# Patient Record
Sex: FEMALE | Race: WHITE
[De-identification: names, ages, dates, MRNs, and addresses within clinical notes are randomized per-mention and may not be internally consistent; named-entity substitution may affect disease eponyms.]

---

## 2017-05-05 PROBLEM — Z00.00 ENCOUNTER FOR PREVENTIVE HEALTH EXAMINATION: Status: ACTIVE | Noted: 2017-05-05

## 2017-05-11 ENCOUNTER — APPOINTMENT (OUTPATIENT)
Dept: SURGERY | Facility: CLINIC | Age: 47
End: 2017-05-11

## 2017-09-21 ENCOUNTER — APPOINTMENT (OUTPATIENT)
Dept: SURGERY | Facility: CLINIC | Age: 47
End: 2017-09-21
Payer: MEDICARE

## 2017-09-21 PROCEDURE — 99212 OFFICE O/P EST SF 10 MIN: CPT

## 2018-05-24 ENCOUNTER — APPOINTMENT (OUTPATIENT)
Dept: SURGERY | Facility: CLINIC | Age: 48
End: 2018-05-24
Payer: MEDICARE

## 2018-05-24 PROCEDURE — 99212 OFFICE O/P EST SF 10 MIN: CPT

## 2018-06-30 ENCOUNTER — TRANSCRIPTION ENCOUNTER (OUTPATIENT)
Age: 48
End: 2018-06-30

## 2018-06-30 ENCOUNTER — OUTPATIENT (OUTPATIENT)
Dept: OUTPATIENT SERVICES | Facility: HOSPITAL | Age: 48
LOS: 1 days | Discharge: HOME | End: 2018-06-30

## 2018-06-30 DIAGNOSIS — Z00.00 ENCOUNTER FOR GENERAL ADULT MEDICAL EXAMINATION WITHOUT ABNORMAL FINDINGS: ICD-10-CM

## 2018-06-30 DIAGNOSIS — Z79.899 OTHER LONG TERM (CURRENT) DRUG THERAPY: ICD-10-CM

## 2018-06-30 DIAGNOSIS — E55.9 VITAMIN D DEFICIENCY, UNSPECIFIED: ICD-10-CM

## 2018-06-30 DIAGNOSIS — E61.1 IRON DEFICIENCY: ICD-10-CM

## 2018-06-30 DIAGNOSIS — Z13.29 ENCOUNTER FOR SCREENING FOR OTHER SUSPECTED ENDOCRINE DISORDER: ICD-10-CM

## 2018-06-30 DIAGNOSIS — E07.9 DISORDER OF THYROID, UNSPECIFIED: ICD-10-CM

## 2018-09-05 ENCOUNTER — APPOINTMENT (OUTPATIENT)
Dept: NEUROLOGY | Facility: CLINIC | Age: 48
End: 2018-09-05

## 2018-10-08 ENCOUNTER — OUTPATIENT (OUTPATIENT)
Dept: OUTPATIENT SERVICES | Facility: HOSPITAL | Age: 48
LOS: 1 days | Discharge: HOME | End: 2018-10-08

## 2018-10-08 DIAGNOSIS — E61.1 IRON DEFICIENCY: ICD-10-CM

## 2018-10-08 DIAGNOSIS — E55.9 VITAMIN D DEFICIENCY, UNSPECIFIED: ICD-10-CM

## 2018-10-09 LAB
24R-OH-CALCIDIOL SERPL-MCNC: 42 NG/ML — SIGNIFICANT CHANGE UP (ref 30–80)
BASOPHILS # BLD AUTO: 0.05 K/UL — SIGNIFICANT CHANGE UP (ref 0–0.2)
BASOPHILS NFR BLD AUTO: 0.8 % — SIGNIFICANT CHANGE UP (ref 0–1)
EOSINOPHIL # BLD AUTO: 0.08 K/UL — SIGNIFICANT CHANGE UP (ref 0–0.7)
EOSINOPHIL NFR BLD AUTO: 1.2 % — SIGNIFICANT CHANGE UP (ref 0–8)
HCT VFR BLD CALC: 45.4 % — SIGNIFICANT CHANGE UP (ref 37–47)
HGB BLD-MCNC: 14.6 G/DL — SIGNIFICANT CHANGE UP (ref 12–16)
IMM GRANULOCYTES NFR BLD AUTO: 0.2 % — SIGNIFICANT CHANGE UP (ref 0.1–0.3)
LYMPHOCYTES # BLD AUTO: 2.06 K/UL — SIGNIFICANT CHANGE UP (ref 1.2–3.4)
LYMPHOCYTES # BLD AUTO: 31.7 % — SIGNIFICANT CHANGE UP (ref 20.5–51.1)
MCHC RBC-ENTMCNC: 30.2 PG — SIGNIFICANT CHANGE UP (ref 27–31)
MCHC RBC-ENTMCNC: 32.2 G/DL — SIGNIFICANT CHANGE UP (ref 32–37)
MCV RBC AUTO: 94 FL — SIGNIFICANT CHANGE UP (ref 81–99)
MONOCYTES # BLD AUTO: 0.29 K/UL — SIGNIFICANT CHANGE UP (ref 0.1–0.6)
MONOCYTES NFR BLD AUTO: 4.5 % — SIGNIFICANT CHANGE UP (ref 1.7–9.3)
NEUTROPHILS # BLD AUTO: 4.01 K/UL — SIGNIFICANT CHANGE UP (ref 1.4–6.5)
NEUTROPHILS NFR BLD AUTO: 61.6 % — SIGNIFICANT CHANGE UP (ref 42.2–75.2)
NRBC # BLD: 0 /100 WBCS — SIGNIFICANT CHANGE UP (ref 0–0)
PLATELET # BLD AUTO: 264 K/UL — SIGNIFICANT CHANGE UP (ref 130–400)
RBC # BLD: 4.83 M/UL — SIGNIFICANT CHANGE UP (ref 4.2–5.4)
RBC # FLD: 14.6 % — HIGH (ref 11.5–14.5)
WBC # BLD: 6.5 K/UL — SIGNIFICANT CHANGE UP (ref 4.8–10.8)
WBC # FLD AUTO: 6.5 K/UL — SIGNIFICANT CHANGE UP (ref 4.8–10.8)

## 2018-10-10 LAB — FERRITIN SERPL-MCNC: 18 NG/ML — SIGNIFICANT CHANGE UP (ref 15–150)

## 2019-03-28 ENCOUNTER — OUTPATIENT (OUTPATIENT)
Dept: OUTPATIENT SERVICES | Facility: HOSPITAL | Age: 49
LOS: 1 days | Discharge: HOME | End: 2019-03-28

## 2019-03-28 DIAGNOSIS — E55.9 VITAMIN D DEFICIENCY, UNSPECIFIED: ICD-10-CM

## 2019-03-28 DIAGNOSIS — Z13.29 ENCOUNTER FOR SCREENING FOR OTHER SUSPECTED ENDOCRINE DISORDER: ICD-10-CM

## 2019-03-28 DIAGNOSIS — E61.1 IRON DEFICIENCY: ICD-10-CM

## 2019-03-29 ENCOUNTER — TRANSCRIPTION ENCOUNTER (OUTPATIENT)
Age: 49
End: 2019-03-29

## 2019-04-04 ENCOUNTER — APPOINTMENT (OUTPATIENT)
Dept: SURGERY | Facility: CLINIC | Age: 49
End: 2019-04-04
Payer: MEDICARE

## 2019-04-04 PROCEDURE — 99212 OFFICE O/P EST SF 10 MIN: CPT

## 2019-04-04 NOTE — PHYSICAL EXAM
[de-identified] : well [de-identified] : no adenopathy [de-identified] : Symmetric x2. No palpable masses. Axilla without adenopathy. Nipples wnl.

## 2019-04-04 NOTE — ASSESSMENT
[FreeTextEntry1] : No findings on images or exam. Due for Mammo/sono in May but push off till 6 months from MRI - so September.

## 2019-04-04 NOTE — HISTORY OF PRESENT ILLNESS
[de-identified] : Presents almost 1 yr from previous for high risk patient. Had MRI 3/19 with good results. Due for mammo/sono in May. No breast complaints.Health is good lost some weight. Recent loss of mother and uncle.

## 2019-05-08 ENCOUNTER — APPOINTMENT (OUTPATIENT)
Dept: OBGYN | Facility: CLINIC | Age: 49
End: 2019-05-08
Payer: MEDICARE

## 2019-05-08 PROCEDURE — 99214 OFFICE O/P EST MOD 30 MIN: CPT | Mod: 25

## 2019-05-08 PROCEDURE — 99000 SPECIMEN HANDLING OFFICE-LAB: CPT

## 2020-04-16 ENCOUNTER — APPOINTMENT (OUTPATIENT)
Dept: SURGERY | Facility: CLINIC | Age: 50
End: 2020-04-16

## 2020-07-02 ENCOUNTER — APPOINTMENT (OUTPATIENT)
Dept: SURGERY | Facility: CLINIC | Age: 50
End: 2020-07-02

## 2020-07-30 ENCOUNTER — APPOINTMENT (OUTPATIENT)
Dept: SURGERY | Facility: CLINIC | Age: 50
End: 2020-07-30
Payer: MEDICARE

## 2020-07-30 PROCEDURE — 99212 OFFICE O/P EST SF 10 MIN: CPT

## 2020-07-30 NOTE — PHYSICAL EXAM
[de-identified] : well [de-identified] : no adenopathy [de-identified] : Symmetric x2. No palp masses. Some thickening on right 6:00, recent bx site. Axilla w/o adenopathy. Nipples wnl.

## 2020-07-30 NOTE — HISTORY OF PRESENT ILLNESS
[de-identified] : Presents for routine f/u. Had MRI and bx and was benign. Still due for mammo/sono for several years. No breast complaints.

## 2021-02-04 ENCOUNTER — APPOINTMENT (OUTPATIENT)
Dept: SURGERY | Facility: CLINIC | Age: 51
End: 2021-02-04
Payer: MEDICARE

## 2021-02-04 PROCEDURE — 99212 OFFICE O/P EST SF 10 MIN: CPT

## 2021-02-04 NOTE — HISTORY OF PRESENT ILLNESS
[de-identified] : High risk f'/u 6 month surveillance.Had mamm/sono without finding. Denies any breast complaints.No new medical issues. Reports family issues are fine.

## 2021-02-04 NOTE — PHYSICAL EXAM
[de-identified] : well [de-identified] : no adenopathy [de-identified] : Symmetric X2, no palpable masses, no adenopathy, nipples wnl.

## 2021-02-04 NOTE — ASSESSMENT
[FreeTextEntry1] : No significant findings on exam or imaging. Confused by mammo recommendation for MRI which is due in June. No need for sono f/u in 6 months due to MRI. Discussed this with patient and agrees.

## 2021-06-16 ENCOUNTER — APPOINTMENT (OUTPATIENT)
Dept: OBGYN | Facility: CLINIC | Age: 51
End: 2021-06-16
Payer: MEDICARE

## 2021-06-16 VITALS
SYSTOLIC BLOOD PRESSURE: 122 MMHG | HEIGHT: 61 IN | DIASTOLIC BLOOD PRESSURE: 78 MMHG | BODY MASS INDEX: 25.49 KG/M2 | WEIGHT: 135 LBS | TEMPERATURE: 98 F

## 2021-06-16 PROCEDURE — G0101: CPT

## 2021-06-16 NOTE — DISCUSSION/SUMMARY
[FreeTextEntry1] : Pap done\par Self breast exam stressed\par Follow-up with breast specialist\par Keep menstrual calendar\par Issues regarding irregular menses/perimenopause discussed with patient\par Prescribed hormone profile and pelvic ultrasound\par Follow-up yearly or as needed

## 2021-06-17 ENCOUNTER — APPOINTMENT (OUTPATIENT)
Dept: SURGERY | Facility: CLINIC | Age: 51
End: 2021-06-17
Payer: MEDICARE

## 2021-06-17 PROCEDURE — 99212 OFFICE O/P EST SF 10 MIN: CPT

## 2021-06-17 NOTE — ASSESSMENT
[FreeTextEntry1] : No findings of concern on MRI or exam. Explained request for left sono, script given. Call for results. Discussed future f/u with Dr Burgos since I am retiring.

## 2021-06-17 NOTE — PHYSICAL EXAM
[de-identified] : well [de-identified] : no adenopathy [de-identified] : Symmetric X2. No palpable masses. Axilla w/o adenopathy. Nipples wnl.

## 2021-06-17 NOTE — HISTORY OF PRESENT ILLNESS
[de-identified] : Presents for interval f/u, high risk patient due to family history. Had recent MRI. No breast complaints. No new health issues.

## 2021-06-17 NOTE — PLAN
[FreeTextEntry1] : Check results of left breast sono. F/u in 6 months. Will be due for mammo/sono at that time.

## 2021-07-28 ENCOUNTER — APPOINTMENT (OUTPATIENT)
Dept: OBGYN | Facility: CLINIC | Age: 51
End: 2021-07-28
Payer: MEDICARE

## 2021-07-28 VITALS
BODY MASS INDEX: 25.49 KG/M2 | HEIGHT: 61 IN | DIASTOLIC BLOOD PRESSURE: 72 MMHG | SYSTOLIC BLOOD PRESSURE: 118 MMHG | WEIGHT: 135 LBS

## 2021-07-28 DIAGNOSIS — N84.1 POLYP OF CERVIX UTERI: ICD-10-CM

## 2021-07-28 DIAGNOSIS — N84.0 POLYP OF CORPUS UTERI: ICD-10-CM

## 2021-07-28 PROCEDURE — 99213 OFFICE O/P EST LOW 20 MIN: CPT

## 2021-09-17 ENCOUNTER — OUTPATIENT (OUTPATIENT)
Dept: OUTPATIENT SERVICES | Facility: HOSPITAL | Age: 51
LOS: 1 days | Discharge: HOME | End: 2021-09-17
Payer: MEDICARE

## 2021-09-17 VITALS
SYSTOLIC BLOOD PRESSURE: 114 MMHG | DIASTOLIC BLOOD PRESSURE: 65 MMHG | WEIGHT: 150.8 LBS | RESPIRATION RATE: 18 BRPM | OXYGEN SATURATION: 99 % | HEIGHT: 61 IN | HEART RATE: 63 BPM | TEMPERATURE: 96 F

## 2021-09-17 DIAGNOSIS — Z90.89 ACQUIRED ABSENCE OF OTHER ORGANS: Chronic | ICD-10-CM

## 2021-09-17 DIAGNOSIS — Z01.818 ENCOUNTER FOR OTHER PREPROCEDURAL EXAMINATION: ICD-10-CM

## 2021-09-17 DIAGNOSIS — N93.8 OTHER SPECIFIED ABNORMAL UTERINE AND VAGINAL BLEEDING: ICD-10-CM

## 2021-09-17 DIAGNOSIS — Z98.890 OTHER SPECIFIED POSTPROCEDURAL STATES: Chronic | ICD-10-CM

## 2021-09-17 LAB
ALBUMIN SERPL ELPH-MCNC: 4.4 G/DL — SIGNIFICANT CHANGE UP (ref 3.5–5.2)
ALP SERPL-CCNC: 56 U/L — SIGNIFICANT CHANGE UP (ref 30–115)
ALT FLD-CCNC: 6 U/L — SIGNIFICANT CHANGE UP (ref 0–41)
ANION GAP SERPL CALC-SCNC: 12 MMOL/L — SIGNIFICANT CHANGE UP (ref 7–14)
AST SERPL-CCNC: 19 U/L — SIGNIFICANT CHANGE UP (ref 0–41)
BASOPHILS # BLD AUTO: 0.08 K/UL — SIGNIFICANT CHANGE UP (ref 0–0.2)
BASOPHILS NFR BLD AUTO: 1.2 % — HIGH (ref 0–1)
BILIRUB SERPL-MCNC: 0.3 MG/DL — SIGNIFICANT CHANGE UP (ref 0.2–1.2)
BUN SERPL-MCNC: 18 MG/DL — SIGNIFICANT CHANGE UP (ref 10–20)
CALCIUM SERPL-MCNC: 9.2 MG/DL — SIGNIFICANT CHANGE UP (ref 8.5–10.1)
CHLORIDE SERPL-SCNC: 102 MMOL/L — SIGNIFICANT CHANGE UP (ref 98–110)
CO2 SERPL-SCNC: 23 MMOL/L — SIGNIFICANT CHANGE UP (ref 17–32)
CREAT SERPL-MCNC: 1.1 MG/DL — SIGNIFICANT CHANGE UP (ref 0.7–1.5)
EOSINOPHIL # BLD AUTO: 0.15 K/UL — SIGNIFICANT CHANGE UP (ref 0–0.7)
EOSINOPHIL NFR BLD AUTO: 2.2 % — SIGNIFICANT CHANGE UP (ref 0–8)
GLUCOSE SERPL-MCNC: 70 MG/DL — SIGNIFICANT CHANGE UP (ref 70–99)
HCT VFR BLD CALC: 43.6 % — SIGNIFICANT CHANGE UP (ref 37–47)
HGB BLD-MCNC: 14.7 G/DL — SIGNIFICANT CHANGE UP (ref 12–16)
IMM GRANULOCYTES NFR BLD AUTO: 0.1 % — SIGNIFICANT CHANGE UP (ref 0.1–0.3)
LYMPHOCYTES # BLD AUTO: 2.44 K/UL — SIGNIFICANT CHANGE UP (ref 1.2–3.4)
LYMPHOCYTES # BLD AUTO: 35.7 % — SIGNIFICANT CHANGE UP (ref 20.5–51.1)
MCHC RBC-ENTMCNC: 30.2 PG — SIGNIFICANT CHANGE UP (ref 27–31)
MCHC RBC-ENTMCNC: 33.7 G/DL — SIGNIFICANT CHANGE UP (ref 32–37)
MCV RBC AUTO: 89.5 FL — SIGNIFICANT CHANGE UP (ref 81–99)
MONOCYTES # BLD AUTO: 0.36 K/UL — SIGNIFICANT CHANGE UP (ref 0.1–0.6)
MONOCYTES NFR BLD AUTO: 5.3 % — SIGNIFICANT CHANGE UP (ref 1.7–9.3)
NEUTROPHILS # BLD AUTO: 3.8 K/UL — SIGNIFICANT CHANGE UP (ref 1.4–6.5)
NEUTROPHILS NFR BLD AUTO: 55.5 % — SIGNIFICANT CHANGE UP (ref 42.2–75.2)
NRBC # BLD: 0 /100 WBCS — SIGNIFICANT CHANGE UP (ref 0–0)
PLATELET # BLD AUTO: 234 K/UL — SIGNIFICANT CHANGE UP (ref 130–400)
POTASSIUM SERPL-MCNC: 4.4 MMOL/L — SIGNIFICANT CHANGE UP (ref 3.5–5)
POTASSIUM SERPL-SCNC: 4.4 MMOL/L — SIGNIFICANT CHANGE UP (ref 3.5–5)
PROT SERPL-MCNC: 7 G/DL — SIGNIFICANT CHANGE UP (ref 6–8)
RBC # BLD: 4.87 M/UL — SIGNIFICANT CHANGE UP (ref 4.2–5.4)
RBC # FLD: 12.5 % — SIGNIFICANT CHANGE UP (ref 11.5–14.5)
SODIUM SERPL-SCNC: 137 MMOL/L — SIGNIFICANT CHANGE UP (ref 135–146)
WBC # BLD: 6.84 K/UL — SIGNIFICANT CHANGE UP (ref 4.8–10.8)
WBC # FLD AUTO: 6.84 K/UL — SIGNIFICANT CHANGE UP (ref 4.8–10.8)

## 2021-09-17 PROCEDURE — 93010 ELECTROCARDIOGRAM REPORT: CPT

## 2021-09-17 NOTE — H&P PST ADULT - NSICDXPASTSURGICALHX_GEN_ALL_CORE_FT
PAST SURGICAL HISTORY:  H/O knee surgery acl repair and meniscus repair    History of tonsillectomy

## 2021-09-17 NOTE — H&P PST ADULT - NSANTHOSAYNRD_GEN_A_CORE
No. NEEL screening performed.  STOP BANG Legend: 0-2 = LOW Risk; 3-4 = INTERMEDIATE Risk; 5-8 = HIGH Risk

## 2021-09-17 NOTE — H&P PST ADULT - NSICDXPASTMEDICALHX_GEN_ALL_CORE_FT
PAST MEDICAL HISTORY:  Asthma over 10yrs ago    H/O carpal tunnel syndrome     OA (osteoarthritis)     Seasonal allergies

## 2021-09-17 NOTE — H&P PST ADULT - REASON FOR ADMISSION
49 Y/O F SCHEDULED FOR PAST FOR Hysteroscopy Dilation & Curettage ON 10/8/21 with Dr Marti on 10/8/21 under [place]. Pt reports during routine checkup/transvaginal ultrasound she had a polyp on cervix. Now for scheduled procedure. Denies pain/vaginal bleeding.

## 2021-09-17 NOTE — H&P PST ADULT - NSICDXFAMILYHX_GEN_ALL_CORE_FT
FAMILY HISTORY:  Mother  Still living? Unknown  FHx: breast cancer, Age at diagnosis: Age Unknown    Grandparent  Still living? Unknown  FHx: breast cancer, Age at diagnosis: Age Unknown    Aunt  Still living? Unknown  FHx: breast cancer, Age at diagnosis: Age Unknown

## 2021-09-17 NOTE — H&P PST ADULT - EYES
Faxed pre op h&p, cxr, ekg, and labs to attn: Joe Bynum at [facility] fax # [phone]
EOMI; PERRL; no drainage or redness

## 2021-09-17 NOTE — H&P PST ADULT - HISTORY OF PRESENT ILLNESS
CURRENTLY  DENIES ANY CP, SOB,PALPITATIONS,COUGH OR DYSURIA  EXERCISE TOLERANCE 2 FOS WITHOUT SOB    AS PER PATIENT  this is his/her complete medical history including medications - PRESCRIPTIONS  OVER THE COUNTER MEDS    pt denies any covid s/s, Has tested positive in the past 3/2020.  Has not Received covid vaccine  pt advised self quarantine till day of procedure    Anesthesia Alert  NO--Difficult Airway  NO--History of neck surgery or radiation  NO--Limited ROM of neck  NO--History of Malignant hyperthermia  NO--No personal or family history of Pseudocholinesterase deficiency.  NO--Prior Anesthesia Complication  NO--Latex Allergy  NO--Loose teeth  NO--History of Rheumatoid Arthritis  NO--NEEL  NO--Bleeding risk  NO--Other_____    Patient verbalized understanding of instructions and was given the opportunity to ask questions and have them answered.

## 2021-09-29 PROBLEM — J45.909 UNSPECIFIED ASTHMA, UNCOMPLICATED: Chronic | Status: ACTIVE | Noted: 2021-09-17

## 2021-09-29 PROBLEM — J30.2 OTHER SEASONAL ALLERGIC RHINITIS: Chronic | Status: ACTIVE | Noted: 2021-09-17

## 2021-09-29 PROBLEM — Z86.69 PERSONAL HISTORY OF OTHER DISEASES OF THE NERVOUS SYSTEM AND SENSE ORGANS: Chronic | Status: ACTIVE | Noted: 2021-09-17

## 2021-09-29 PROBLEM — M19.90 UNSPECIFIED OSTEOARTHRITIS, UNSPECIFIED SITE: Chronic | Status: ACTIVE | Noted: 2021-09-17

## 2021-10-05 ENCOUNTER — LABORATORY RESULT (OUTPATIENT)
Age: 51
End: 2021-10-05

## 2021-10-05 ENCOUNTER — OUTPATIENT (OUTPATIENT)
Dept: OUTPATIENT SERVICES | Facility: HOSPITAL | Age: 51
LOS: 1 days | Discharge: HOME | End: 2021-10-05

## 2021-10-05 DIAGNOSIS — Z90.89 ACQUIRED ABSENCE OF OTHER ORGANS: Chronic | ICD-10-CM

## 2021-10-05 DIAGNOSIS — Z98.890 OTHER SPECIFIED POSTPROCEDURAL STATES: Chronic | ICD-10-CM

## 2021-10-05 DIAGNOSIS — Z11.59 ENCOUNTER FOR SCREENING FOR OTHER VIRAL DISEASES: ICD-10-CM

## 2021-10-05 DIAGNOSIS — N88.2 STRICTURE AND STENOSIS OF CERVIX UTERI: ICD-10-CM

## 2021-10-05 RX ORDER — MISOPROSTOL 200 UG/1
200 TABLET ORAL
Qty: 2 | Refills: 0 | Status: ACTIVE | COMMUNITY
Start: 2021-10-05 | End: 1900-01-01

## 2021-10-08 ENCOUNTER — APPOINTMENT (OUTPATIENT)
Dept: OBGYN | Facility: CLINIC | Age: 51
End: 2021-10-08

## 2021-10-08 ENCOUNTER — RESULT REVIEW (OUTPATIENT)
Age: 51
End: 2021-10-08

## 2021-10-08 ENCOUNTER — OUTPATIENT (OUTPATIENT)
Dept: OUTPATIENT SERVICES | Facility: HOSPITAL | Age: 51
LOS: 1 days | Discharge: HOME | End: 2021-10-08
Payer: MEDICARE

## 2021-10-08 VITALS
SYSTOLIC BLOOD PRESSURE: 100 MMHG | OXYGEN SATURATION: 99 % | RESPIRATION RATE: 20 BRPM | HEART RATE: 56 BPM | DIASTOLIC BLOOD PRESSURE: 56 MMHG

## 2021-10-08 VITALS
SYSTOLIC BLOOD PRESSURE: 113 MMHG | HEART RATE: 70 BPM | TEMPERATURE: 98 F | HEIGHT: 61 IN | RESPIRATION RATE: 18 BRPM | OXYGEN SATURATION: 99 % | WEIGHT: 150.8 LBS | DIASTOLIC BLOOD PRESSURE: 53 MMHG

## 2021-10-08 DIAGNOSIS — Z90.89 ACQUIRED ABSENCE OF OTHER ORGANS: Chronic | ICD-10-CM

## 2021-10-08 DIAGNOSIS — Z98.890 OTHER SPECIFIED POSTPROCEDURAL STATES: Chronic | ICD-10-CM

## 2021-10-08 PROCEDURE — 58558 HYSTEROSCOPY BIOPSY: CPT

## 2021-10-08 PROCEDURE — 88305 TISSUE EXAM BY PATHOLOGIST: CPT | Mod: 26

## 2021-10-08 RX ORDER — CHOLECALCIFEROL (VITAMIN D3) 125 MCG
1 CAPSULE ORAL
Qty: 0 | Refills: 0 | DISCHARGE

## 2021-10-08 RX ORDER — OXYCODONE AND ACETAMINOPHEN 5; 325 MG/1; MG/1
1 TABLET ORAL EVERY 4 HOURS
Refills: 0 | Status: DISCONTINUED | OUTPATIENT
Start: 2021-10-08 | End: 2021-10-08

## 2021-10-08 RX ORDER — FERROUS SULFATE 325(65) MG
65 TABLET ORAL
Qty: 0 | Refills: 0 | DISCHARGE

## 2021-10-08 RX ORDER — SODIUM CHLORIDE 9 MG/ML
1000 INJECTION, SOLUTION INTRAVENOUS
Refills: 0 | Status: DISCONTINUED | OUTPATIENT
Start: 2021-10-08 | End: 2021-10-22

## 2021-10-08 RX ORDER — HYDROMORPHONE HYDROCHLORIDE 2 MG/ML
0.5 INJECTION INTRAMUSCULAR; INTRAVENOUS; SUBCUTANEOUS
Refills: 0 | Status: DISCONTINUED | OUTPATIENT
Start: 2021-10-08 | End: 2021-10-08

## 2021-10-08 RX ORDER — ONDANSETRON 8 MG/1
4 TABLET, FILM COATED ORAL ONCE
Refills: 0 | Status: DISCONTINUED | OUTPATIENT
Start: 2021-10-08 | End: 2021-10-22

## 2021-10-08 RX ORDER — ALBUTEROL 90 UG/1
0 AEROSOL, METERED ORAL
Qty: 0 | Refills: 0 | DISCHARGE

## 2021-10-08 RX ORDER — CETIRIZINE HYDROCHLORIDE 10 MG/1
1 TABLET ORAL
Qty: 0 | Refills: 0 | DISCHARGE

## 2021-10-08 RX ADMIN — SODIUM CHLORIDE 100 MILLILITER(S): 9 INJECTION, SOLUTION INTRAVENOUS at 10:11

## 2021-10-08 NOTE — BRIEF OPERATIVE NOTE - OPERATION/FINDINGS
Examined under anesthesia which revealed a normal anteverted uterus. Normal vagina and cervix. No adnexal masses palpated. Hysteroscopy revealed normal ostia bilaterally. No endometrial polyps seen. Possible endocervical polyp. Gentle sharp curettage revealed moderate amounts of tissue. Examined under anesthesia which revealed a normal anteverted uterus. Normal vagina and cervix. No adnexal masses palpated. Hysteroscopy revealed normal ostia bilaterally. No endometrial polyps or descrite endocervical polyp noted. Gentle sharp curettage revealed moderate amount of tissue. Examined under anesthesia which revealed a normal anteverted uterus. Normal vagina and cervix. No adnexal masses palpated. Hysteroscopy revealed normal ostia bilaterally. No endometrial polyps or discrete endocervical polyp noted. Gentle sharp curettage revealed moderate amount of tissue.

## 2021-10-08 NOTE — CHART NOTE - NSCHARTNOTEFT_GEN_A_CORE
PACU ANESTHESIA ADMISSION NOTE      Procedure: Hysteroscopy, with dilation and curettage      Post op diagnosis:  DUB (dysfunctional uterine bleeding)        ____  Intubated  TV:______       Rate: ______      FiO2: ______    _x___  Patent Airway    _x___  Full return of protective reflexes    _x___  Full recovery from anesthesia / back to baseline status    Vitals:            T: 98               BP :    103/59            R:  19            Sat:  96             P:71      Mental Status:  _x___ Awake   _____ Alert   _____ Drowsy   _____ Sedated    Nausea/Vomiting:  _x___  NO       ______Yes,   See Post - Op Orders         Pain Scale (0-10):  __0___    Treatment: _x___ None    ____ See Post - Op/PCA Orders    Post - Operative Fluids:   __x__ Oral   ____ See Post - Op Orders    Plan: Discharge:   _x___Home       _____Floor     _____Critical Care    _____  Other:_________________    Comments:  No anesthesia issues or complications noted.  Discharge when criteria met.

## 2021-10-08 NOTE — ASU DISCHARGE PLAN (ADULT/PEDIATRIC) - CARE PROVIDER_API CALL
Bj Marti  Obstetrics and Gynecology  [address] [place] [address]  Phone: [phone]  Fax: [phone]  Follow Up Time: 2 weeks

## 2021-10-08 NOTE — BRIEF OPERATIVE NOTE - NSICDXBRIEFPREOP_GEN_ALL_CORE_FT
PRE-OP DIAGNOSIS:  Endocervical polyp 08-Oct-2021 10:14:17  Charlotte Freeman  DUB (dysfunctional uterine bleeding) 08-Oct-2021 10:14:07  Charlotte Freeman

## 2021-10-11 LAB — SURGICAL PATHOLOGY STUDY: SIGNIFICANT CHANGE UP

## 2021-10-14 ENCOUNTER — APPOINTMENT (OUTPATIENT)
Dept: BREAST CENTER | Facility: CLINIC | Age: 51
End: 2021-10-14
Payer: MEDICARE

## 2021-10-14 VITALS
HEIGHT: 61 IN | DIASTOLIC BLOOD PRESSURE: 80 MMHG | TEMPERATURE: 97.2 F | SYSTOLIC BLOOD PRESSURE: 126 MMHG | BODY MASS INDEX: 28.32 KG/M2 | WEIGHT: 150 LBS

## 2021-10-14 DIAGNOSIS — N60.11 DIFFUSE CYSTIC MASTOPATHY OF RIGHT BREAST: ICD-10-CM

## 2021-10-14 DIAGNOSIS — N93.8 OTHER SPECIFIED ABNORMAL UTERINE AND VAGINAL BLEEDING: ICD-10-CM

## 2021-10-14 DIAGNOSIS — J45.909 UNSPECIFIED ASTHMA, UNCOMPLICATED: ICD-10-CM

## 2021-10-14 DIAGNOSIS — N60.12 DIFFUSE CYSTIC MASTOPATHY OF LEFT BREAST: ICD-10-CM

## 2021-10-14 PROCEDURE — 99212 OFFICE O/P EST SF 10 MIN: CPT

## 2021-10-14 NOTE — ASSESSMENT
[FreeTextEntry1] : Lo is 50 year old female here as a high risk patient due to family history. Now with BIRADS 3 abnormality on MRI and US\par \par On physical exam, there are no discrete masses in either breast or axilla. There is no nipple discharge or inversion bilaterally. There are no skin changes bilaterally.\par \par Her imaging as follows \par 01/11/21 b/l screening mammo and US\par -heterogeneously dense breasts\par -benign bx marker b/l breast \par -b/l asymmetries are not significantly changed compared to previous years. \par -b/l benign appearing calcifications. \par \par LEFT US\par -@1N2- 0.6 cm  foci of fibrocystic change \par -@6n2- 0.5cm foci of fibrocystic change \par BIRADS0\par \par 6/10/21 b/l MRI\par -breasts are moderately glandular\par -benign bx marker at the site of privious linear enhancement in the right lower central breast.\par -no definite MRI evidence of malignancy in either breast \par BIRADS3 \par \par 7/6/21 \par LEFT US:\par -@1N2 0.6 cm  foci of fibrocystic change\par -@6n2- 0.4cm foci of fibrocystic change \par BIRADS3 \par \par \par We discussed dense breasts.  Increasing breast density has been found to increase ones risk of breast cancer, but at this time, there is no clear indication for additional imaging in this setting, as both US and MRI have not been found to improve survival.  One can consider bilateral screening US.  However, out of 1000 women screened, the use of routine US will only identify an additional 3-5 cancers.  The use of US was found to increase the likelihood of undergoing more imaging and more biopsies.  She does have dense breasts.  We have decided to proceed with screening bilateral breast US at this time.  This will be scheduled with her next screening mammogram.\par \par We also discussed BIRADS 3 lesions.  These lesions have a 2% chance of harboring malignancy.  There is always an option to obtain a tissue sample with a biopsy to confirm the diagnosis.   The procedure was described in detail including the placement of a tissue marker clip.  The risks of the procedure, including but not limited to bleeding and infection were also explained.  She is not interested in biopsy at this time and agrees to continue with short-term interval imaging.\par \par HER RISK ASSESSMENT: \par Hiwot\par 5yr -- 3.1%\par lifetime --23.4%\par \par TC v6\par 5yr --3.9 %\par lifetime -- 21.8%\par This puts her in the high risk category for breast cancer because she has a lifetime risk of >20%.  She qualifies for annual screening MRIs which would be done in an alternating fashion with her screening mammograms such that an imaging study and clinical breast exam would be performed every 6 months.  The use of MRIs have not been shown to prolong survival, however out of 1000 women screened, an additional 14-15 cancers will be identified.  The use of MRIs, has, however, been shown to increase the number of procedures and additional imaging because although it is a very sensitive test, it is not as specific.  This was discussed with the patient and she would like to proceed with screening MRIs.  This will be scheduled for 11/2018.\par \par In addition, because her 5yr risk exceeds 1.7%, she also qualifies for chemopreventative medications.  For premenopausal women, we can offer tamoxifen 20 mg daily for 5 years and for postmenopausal women, we can offer either tamoxifen or raloxifene x 5 years.  This medication has been found to reduce the risk of breast cancer by about 50%.  The risks associated with these medications include thromboembolic disease, uterine cancer, and cataracts, as well as some side effects including hot flashes, vasomotor symptoms, weight gain or hair thinning/loss.  This was briefly discussed with the patient, however she would like to think about this and will let me know if she would like more information regarding this medication.\par \par \par PLAN\par - b/l mammo and US  January 2022\par -MRI on 6/10/2022\par -follow up after

## 2021-10-14 NOTE — HISTORY OF PRESENT ILLNESS
[FreeTextEntry1] : Lo is 50 year old female here as a high risk patient due to family history. Now with BIRADS 3 abnormality on MRI and US\par She has no breast related complaints at present time.\par \par Her imaging as follows \par 01/11/21 b/l screening mammo and US\par -heterogeneously dense breasts\par -benign bx marker b/l breast \par -b/l asymmetries are not significantly changed compared to previous years. \par -b/l benign appearing calcifications. \par \par LEFT US\par -@1N2- 0.6 cm  foci of fibrocystic change \par -@6n2- 0.5cm foci of fibrocystic change \par BIRADS0\par \par 6/10/21 b/l MRI\par -breasts are moderately glandular\par -benign bx marker at the site of privious linear enhancement in the right lower central breast.\par -no definite MRI evidence of malignancy in either breast \par BIRADS3 \par \par 7/6/21 \par LEFT US:\par -@1N2 0.6 cm  foci of fibrocystic change\par -@6n2- 0.4cm foci of fibrocystic change \par BIRADS3 \par \par HISTORIC RISK FACTORS\par -hx of b/l breast bx - benign \par -fam hx of breast cancer in mother at 48 yo, maternal grandmother age 53, maternal aunt age 50\par -G0\par -no OCP use\par -reports negative genetic testing in 2014\par -no GYN surgery\par \par \par RISK ASSESSMENT: \par Hiwot\par 5yr -- 3.1%\par lifetime --23.4%\par \par TC v6\par 5yr --3.9 %\par lifetime -- 21.8%\par

## 2021-10-14 NOTE — PHYSICAL EXAM
[Normocephalic] : normocephalic [Atraumatic] : atraumatic [EOMI] : extra ocular movement intact [Examined in the supine and seated position] : examined in the supine and seated position [Symmetrical] : symmetrical [No dominant masses] : no dominant masses in right breast  [No dominant masses] : no dominant masses left breast [No Nipple Retraction] : no left nipple retraction [No Nipple Discharge] : no left nipple discharge [No Axillary Lymphadenopathy] : no left axillary lymphadenopathy [No Edema] : no edema [No Rashes] : no rashes [No Ulceration] : no ulceration [de-identified] : On physical exam, there are no discrete masses in either breast or axilla. There is no nipple discharge or inversion bilaterally. There are no skin changes bilaterally.\par \par

## 2021-10-27 ENCOUNTER — APPOINTMENT (OUTPATIENT)
Dept: OBGYN | Facility: CLINIC | Age: 51
End: 2021-10-27
Payer: MEDICARE

## 2021-10-27 VITALS
WEIGHT: 150 LBS | SYSTOLIC BLOOD PRESSURE: 118 MMHG | DIASTOLIC BLOOD PRESSURE: 74 MMHG | HEIGHT: 61 IN | BODY MASS INDEX: 28.32 KG/M2

## 2021-10-27 DIAGNOSIS — N92.6 IRREGULAR MENSTRUATION, UNSPECIFIED: ICD-10-CM

## 2021-10-27 PROCEDURE — 99213 OFFICE O/P EST LOW 20 MIN: CPT

## 2021-10-27 NOTE — DISCUSSION/SUMMARY
[FreeTextEntry1] : The possibility of endocervical polyp versus endometrial polyp was discussed with the patient.\par Issues regarding endometrial/endocervical polyp discussed with patient including possible etiologies, diagnostic and treatment options\par Patient decided to go forward with examination under anesthesia/hysteroscopy D&C\par Risks benefits and alternatives of procedure discussed with the patient\par We will schedule the procedure.\par

## 2021-10-27 NOTE — REASON FOR VISIT
[Post Op Day: ___] : Post-Op Day:  #[unfilled] [Follow-Up] : a follow-up evaluation of [Abnormal Uterine Bleeding] : abnormal uterine bleeding

## 2021-10-27 NOTE — HISTORY OF PRESENT ILLNESS
[FreeTextEntry1] : Patient is 50 years old para 0-0-0-0 last menstrual period October 19, 2021\par She is status post hysteroscopy D&C on October 8, 2021\par The pathology results were discussed with patient which revealed fragments of disordered proliferative endometrium. Endo and ectocervical tissue with no significant diagnostic abnormality.

## 2021-10-27 NOTE — HISTORY OF PRESENT ILLNESS
[FreeTextEntry1] : Patient is 50 years old para 0-0-0-0 last menstrual period July 12, 2021\par She is here for follow-up of labs and pelvic ultrasound\par Labs were noted to be within normal limits.\par Pelvic ultrasound revealed a fundal fibroid measuring 1.2 cm, endometrium measures up to 0.5 cm, left ovary contains a simple 1.5 cm follicle.\par Within the endocervical canal there is fluid noted as well as a 1.1 cm echogenic avascular soft tissue nodule this could represent a polyp among other possibilities.

## 2021-10-27 NOTE — DISCUSSION/SUMMARY
[FreeTextEntry1] : Issues regarding irregular menses/perimenopause discussed with patient including various etiologies, diagnostic and treatment options.\par Patient advised to keep a menstrual calendar\par Follow-up with breast specialist as scheduled\par Follow-up yearly or as needed

## 2022-01-05 ENCOUNTER — RESULT REVIEW (OUTPATIENT)
Age: 52
End: 2022-01-05

## 2022-01-05 ENCOUNTER — OUTPATIENT (OUTPATIENT)
Dept: OUTPATIENT SERVICES | Facility: HOSPITAL | Age: 52
LOS: 1 days | Discharge: HOME | End: 2022-01-05
Payer: MEDICARE

## 2022-01-05 DIAGNOSIS — R92.2 INCONCLUSIVE MAMMOGRAM: ICD-10-CM

## 2022-01-05 DIAGNOSIS — Z98.890 OTHER SPECIFIED POSTPROCEDURAL STATES: Chronic | ICD-10-CM

## 2022-01-05 DIAGNOSIS — Z12.31 ENCOUNTER FOR SCREENING MAMMOGRAM FOR MALIGNANT NEOPLASM OF BREAST: ICD-10-CM

## 2022-01-05 DIAGNOSIS — Z90.89 ACQUIRED ABSENCE OF OTHER ORGANS: Chronic | ICD-10-CM

## 2022-01-05 PROCEDURE — 77063 BREAST TOMOSYNTHESIS BI: CPT | Mod: 26

## 2022-01-05 PROCEDURE — 76641 ULTRASOUND BREAST COMPLETE: CPT | Mod: 26,50

## 2022-01-05 PROCEDURE — 77067 SCR MAMMO BI INCL CAD: CPT | Mod: 26

## 2022-01-25 ENCOUNTER — APPOINTMENT (OUTPATIENT)
Dept: BREAST CENTER | Facility: CLINIC | Age: 52
End: 2022-01-25
Payer: MEDICARE

## 2022-01-25 VITALS
DIASTOLIC BLOOD PRESSURE: 79 MMHG | WEIGHT: 150 LBS | HEIGHT: 61 IN | BODY MASS INDEX: 28.32 KG/M2 | HEART RATE: 68 BPM | TEMPERATURE: 97.2 F | SYSTOLIC BLOOD PRESSURE: 112 MMHG

## 2022-01-25 DIAGNOSIS — Z80.3 FAMILY HISTORY OF MALIGNANT NEOPLASM OF BREAST: ICD-10-CM

## 2022-01-25 DIAGNOSIS — N60.12 DIFFUSE CYSTIC MASTOPATHY OF RIGHT BREAST: ICD-10-CM

## 2022-01-25 DIAGNOSIS — N60.11 DIFFUSE CYSTIC MASTOPATHY OF RIGHT BREAST: ICD-10-CM

## 2022-01-25 PROCEDURE — 99213 OFFICE O/P EST LOW 20 MIN: CPT

## 2022-01-25 NOTE — HISTORY OF PRESENT ILLNESS
[FreeTextEntry1] : Lo is 50 year old female here as a high risk patient due to family history. Now with BIRADS 3 abnormality on MRI and US\par She has no breast related complaints at present time.\par \par Her imaging as follows \par 01/11/21 b/l screening mammo and US\par -heterogeneously dense breasts\par -benign bx marker b/l breast \par -b/l asymmetries are not significantly changed compared to previous years. \par -b/l benign appearing calcifications. \par \par LEFT US\par -@1N2- 0.6 cm  foci of fibrocystic change \par -@6n2- 0.5cm foci of fibrocystic change \par BIRADS0\par \par 6/10/21 b/l MRI\par -breasts are moderately glandular\par -benign bx marker at the site of privious linear enhancement in the right lower central breast.\par -no definite MRI evidence of malignancy in either breast \par BIRADS3 \par \par 7/6/21 \par LEFT US:\par -@1N2 0.6 cm  foci of fibrocystic change\par -@6n2- 0.4cm foci of fibrocystic change \par BIRADS3 \par \par HISTORIC RISK FACTORS\par -hx of b/l breast bx - benign \par -fam hx of breast cancer in mother at 48 yo, maternal grandmother age 53, maternal aunt age 50\par -G0\par -no OCP use\par -reports negative genetic testing in 2014\par -no GYN surgery\par \par \par RISK ASSESSMENT: \par Hiwot\par 5yr -- 3.1%\par lifetime --23.4%\par \par TC v6\par 5yr --3.9 %\par lifetime -- 21.8%\par \par \par INTERVAL HISTORY: \par 01/25/2022 --\par Lo is 51 year old female here for follow-up; she is a high risk patient due to family history.\par \par She has no breast related complaints at this time.  She denies any breast pain, has not palpated any new palpable masses in either breast and denies any nipple discharge or retraction.\par \par Her most recent imaging is as follows:\par 01/05/2022 - B/L Screening Mammo & Sono:\par -breasts are heterogeneously dense\par -No suspicious mass, grouping of calcifications, or other abnormality is identified Deemed BIRADS1\par RIGHT US:\par -@ 8 to 9N 4  stable benign cyst measuring 0.8 x 0.8 x 0.4 cm. (Previously labeled 8N5). This is unchanged compared to 2018 and is benign.\par LEFT US:\par -@1N 3  group of cysts with a conglomerate measurement of 0.6 x 0.3 x 0.6 cm. (Previously labeled 1N 2).  This is unchanged compared to exam dating back to 2016 and is therefore benign.\par -@5N 3 benign cyst measuring 0.3 x 0.3 x 0.3 cm. (Previously labeled 6N2). This is unchanged compared to exam dating back to 2018 and is therefore benign.\par BI-RADS Category 2: Benign\par

## 2022-01-25 NOTE — PHYSICAL EXAM
[Normocephalic] : normocephalic [Atraumatic] : atraumatic [EOMI] : extra ocular movement intact [No Supraclavicular Adenopathy] : no supraclavicular adenopathy [No Cervical Adenopathy] : no cervical adenopathy [Examined in the supine and seated position] : examined in the supine and seated position [Symmetrical] : symmetrical [No dominant masses] : no dominant masses in right breast  [No dominant masses] : no dominant masses left breast [No Nipple Retraction] : no left nipple retraction [No Nipple Discharge] : no left nipple discharge [No Axillary Lymphadenopathy] : no left axillary lymphadenopathy [No Edema] : no edema [No Rashes] : no rashes [No Ulceration] : no ulceration [Breast Nipple Inversion] : nipples not inverted [Breast Nipple Retraction] : nipples not retracted [de-identified] : On physical exam, there are no discrete masses in either breast or axilla. There is no nipple discharge or inversion bilaterally. There are no skin changes bilaterally.\par \par

## 2022-01-25 NOTE — ASSESSMENT
[FreeTextEntry1] : Lo is 51 year old female here for follow-up; she is a high risk patient due to family history.\par She had previous BIRADS-3 imaging; now deemed BIRADS-2.\par \par She has no breast related complaints at this time.  She denies any breast pain, has not palpated any new palpable masses in either breast and denies any nipple discharge or retraction. \par \par On physical exam, there are no discrete masses in either breast or axilla. There is no nipple discharge or inversion bilaterally. There are no skin changes bilaterally.\par \par Her most recent imaging is as follows:\par 01/05/2022 - B/L Screening Mammo & Sono:\par -breasts are heterogeneously dense\par -No suspicious mass, grouping of calcifications, or other abnormality is identified Deemed BIRADS1\par RIGHT US:\par -@ 8 to 9N 4  stable benign cyst measuring 0.8 x 0.8 x 0.4 cm. (Previously labeled 8N5). This is unchanged compared to 2018 and is benign.\par LEFT US:\par -@1N 3  group of cysts with a conglomerate measurement of 0.6 x 0.3 x 0.6 cm. (Previously labeled 1N 2).  This is unchanged compared to exam dating back to 2016 and is therefore benign.\par -@5N 3 benign cyst measuring 0.3 x 0.3 x 0.3 cm. (Previously labeled 6N2). This is unchanged compared to exam dating back to 2018 and is therefore benign.\par BI-RADS Category 2: Benign\par \par \par We discussed dense breasts.  Increasing breast density has been found to increase ones risk of breast cancer, but at this time, there is no clear indication for additional imaging in this setting, as both US and MRI have not been found to improve survival.  One can consider bilateral screening US.  However, out of 1000 women screened, the use of routine US will only identify an additional 3-5 cancers.  The use of US was found to increase the likelihood of undergoing more imaging and more biopsies.  She does have dense breasts.  We have decided to proceed with screening bilateral breast US at this time.  This will be scheduled with her next screening mammogram.\par \par We also discussed BIRADS 3 lesions.  These lesions have a 2% chance of harboring malignancy.  There is always an option to obtain a tissue sample with a biopsy to confirm the diagnosis.   The procedure was described in detail including the placement of a tissue marker clip.  The risks of the procedure, including but not limited to bleeding and infection were also explained.  She is not interested in biopsy at this time and agrees to continue with short-term interval imaging.\par \par HER RISK ASSESSMENT: \par Hiwot\par 5yr -- 3.1%\par lifetime --23.4%\par \par TC v6\par 5yr --3.9 %\par lifetime -- 21.8%\par \par This puts her in the high risk category for breast cancer because she has a lifetime risk of >20%.  She qualifies for annual screening MRIs which would be done in an alternating fashion with her screening mammograms such that an imaging study and clinical breast exam would be performed every 6 months.  The use of MRIs have not been shown to prolong survival, however out of 1000 women screened, an additional 14-15 cancers will be identified.  The use of MRIs, has, however, been shown to increase the number of procedures and additional imaging because although it is a very sensitive test, it is not as specific.  This was discussed with the patient and she would like to proceed with screening MRIs.  This will be scheduled for 11/2018.\par \par In addition, because her 5yr risk exceeds 1.7%, she also qualifies for chemopreventative medications.  For premenopausal women, we can offer tamoxifen 20 mg daily for 5 years and for postmenopausal women, we can offer either tamoxifen or raloxifene x 5 years.  This medication has been found to reduce the risk of breast cancer by about 50%.  The risks associated with these medications include thromboembolic disease, uterine cancer, and cataracts, as well as some side effects including hot flashes, vasomotor symptoms, weight gain or hair thinning/loss.  This was briefly discussed with the patient, however she would like to think about this and will let me know if she would like more information regarding this medication.\par \par PLAN:\par -B/L Breast MRI - June 2022.\par -follow up after

## 2022-01-25 NOTE — DATA REVIEWED
[FreeTextEntry1] : EXAM:  MG MAMMO SCREEN W YONATAN BI#\par \par \par PROCEDURE DATE:  01/05/2022\par \par \par \par INTERPRETATION:  HISTORY:\par Bilateral MG MAMMO SCREEN W YONATAN BI# was performed. Patient is 51 years old and is seen for screening. The patient has no personal history of cancer. The patient has a history of bilateral MRI guided biopsy. The patient has the following family history of breast cancer:  mother, at age 47, breast cancer and maternal grandmother, breast cancer.\par \par RISK ASSESSMENT:\par NCI Lifetime Risk: 23.0\par Tyrer-Cuzick Lifetime Risk: 33.6\par \par CLINICAL BREAST EXAM:\par The patient reports her last clinical breast exam was performed 4 months ago.\par \par COMPARISON STUDIES:\par The present examination has been compared to prior imaging studies performed at an outside location on 09/24/2014, 10/25/2016, 05/09/2018, 01/11/2021 and 07/06/2021.\par \par MAMMOGRAM FINDINGS:\par Mammography was performed including the following views: bilateral craniocaudal with tomosynthesis, bilateral mediolateral oblique with tomosynthesis.  The examination includes digital synthetic 2D and digital tomosynthesis 3D images. Additional imaging analysis was performed using CAD (computer-aided detection) software.\par \par The breasts are heterogeneously dense, which may obscure small masses.\par \par No suspicious mass, grouping of calcifications, or other abnormality is identified.\par \par IMPRESSION:\par No mammographic evidence of malignancy.\par \par RECOMMENDATION:\par Unless otherwise indicated by clinical findings, annual screening mammography recommended.\par \par ASSESSMENT:\par BI-RADS Category 1:  Negative\par EXAM:  MG US BREAST COMPLETE BI\par \par \par PROCEDURE DATE:  01/05/2022\par \par \par \par INTERPRETATION:  Clinical History / Reason for exam: Short interval follow-up of probably benign areas of fibrocystic change in the left breast.\par \par The patient reports her last clinical breast examination was performed 4 months ago.\par \par Family history: Her mother at age 47 and maternal grandmother\par \par Comparisons: Priors dating back to 2014.\par \par Findings:\par \par Ultrasound:\par \par Bilateral whole breast ultrasound was performed.\par \par Right breast:\par At the 8 to 9:00 position 4 cm from the nipple, there is a stable benign cyst measuring 0.8 x 0.8 x 0.4 cm. (Previously labeled 8N5). This is unchanged compared to 2018 and is benign.\par \par No additional solid or cystic masses. No axillary adenopathy.\par \par Left breast:\par At the 1:00 position 3 cm from the nipple, there is a group of cysts with a conglomerate measurement of 0.6 x 0.3 x 0.6 cm. (Previously labeled 1N 2).  This is unchanged compared to exam dating back to 2016 and is therefore benign.\par \par At the 5:00 position 3 cm from the nipple, there is a benign cyst measuring 0.3 x 0.3 x 0.3 cm. (Previously labeled 6N2). This is unchanged compared to exam dating back to 2018 and is therefore benign.\par \par No additional solid or cystic masses. No axillary adenopathy.\par \par Impression: No sonographic evidence of malignancy. Left breast cysts have demonstrated long-term stability and are therefore benign in etiology.\par \par Recommendation: Unless otherwise indicated by clinical findings, annual screening mammography recommended.\par \par BI-RADS Category 2: Benign\par \par

## 2022-06-16 ENCOUNTER — RESULT REVIEW (OUTPATIENT)
Age: 52
End: 2022-06-16

## 2022-06-16 ENCOUNTER — OUTPATIENT (OUTPATIENT)
Dept: OUTPATIENT SERVICES | Facility: HOSPITAL | Age: 52
LOS: 1 days | Discharge: HOME | End: 2022-06-16
Payer: MEDICARE

## 2022-06-16 DIAGNOSIS — N60.11 DIFFUSE CYSTIC MASTOPATHY OF RIGHT BREAST: ICD-10-CM

## 2022-06-16 DIAGNOSIS — Z12.39 ENCOUNTER FOR OTHER SCREENING FOR MALIGNANT NEOPLASM OF BREAST: ICD-10-CM

## 2022-06-16 DIAGNOSIS — Z90.89 ACQUIRED ABSENCE OF OTHER ORGANS: Chronic | ICD-10-CM

## 2022-06-16 DIAGNOSIS — Z98.890 OTHER SPECIFIED POSTPROCEDURAL STATES: Chronic | ICD-10-CM

## 2022-06-16 DIAGNOSIS — N60.12 DIFFUSE CYSTIC MASTOPATHY OF LEFT BREAST: ICD-10-CM

## 2022-06-16 PROCEDURE — 77049 MRI BREAST C-+ W/CAD BI: CPT | Mod: 26,MH

## 2022-07-02 ENCOUNTER — LABORATORY RESULT (OUTPATIENT)
Age: 52
End: 2022-07-02

## 2022-07-06 ENCOUNTER — APPOINTMENT (OUTPATIENT)
Dept: OBGYN | Facility: CLINIC | Age: 52
End: 2022-07-06

## 2022-07-06 VITALS
WEIGHT: 147 LBS | SYSTOLIC BLOOD PRESSURE: 110 MMHG | DIASTOLIC BLOOD PRESSURE: 70 MMHG | BODY MASS INDEX: 27.75 KG/M2 | HEIGHT: 61 IN

## 2022-07-06 DIAGNOSIS — N93.8 OTHER SPECIFIED ABNORMAL UTERINE AND VAGINAL BLEEDING: ICD-10-CM

## 2022-07-06 DIAGNOSIS — N95.1 MENOPAUSAL AND FEMALE CLIMACTERIC STATES: ICD-10-CM

## 2022-07-06 DIAGNOSIS — N83.201 UNSPECIFIED OVARIAN CYST, RIGHT SIDE: ICD-10-CM

## 2022-07-06 PROCEDURE — 58100 BIOPSY OF UTERUS LINING: CPT

## 2022-07-06 PROCEDURE — 99214 OFFICE O/P EST MOD 30 MIN: CPT | Mod: 25

## 2022-07-06 PROCEDURE — 76830 TRANSVAGINAL US NON-OB: CPT

## 2022-07-06 NOTE — DISCUSSION/SUMMARY
[FreeTextEntry1] : Pap done\par Issues regarding anovulatory bleeding/perimenopause discussed with patient\par Endometrial sampling performed without difficulty or complications\par Issues regarding right ovarian cyst discussed with patient\par Follow-up pelvic ultrasound in 4 to 6 weeks.

## 2022-07-06 NOTE — HISTORY OF PRESENT ILLNESS
[FreeTextEntry1] : Patient is 51 years old para 0-0-0-0 last menstrual period Bebe 3, 2022.\par She presents complaining of bleeding/spotting x6 weeks\par Patient went for labs on June 9, 2022 which revealed FSH= 51.5, estradiol= 140, = 15\par She denies pain or heavy bleeding.

## 2022-07-06 NOTE — PROCEDURE
[Cervical Pap Smear] : cervical Pap smear [Liquid Base] : liquid base [Endometrial Biopsy] : Endometrial biopsy [Time out performed] : Pre-procedure time out performed.  Patient's name, date of birth and procedure confirmed. [Consent Obtained] : Consent obtained [Irregular Bleeding] : irregular uterine bleeding [Risks] : risks [Benefits] : benefits [Alternatives] : alternatives [Patient] : patient [Infection] : infection [Bleeding] : bleeding [Allergic Reaction] : allergic reaction [Uterine Perforation] : uterine perforation [Pain] : pain [Negative] : negative pregnancy test [No Premedication] : No premedication [Betadine] : Betadine [None] : none [Tenaculum] : Tenaculum [Easy Passage] : Easy passage [Sounded to ___ cm] : sounded to [unfilled] ~Ucm [Moderate] : moderate [Sent to Pathology] : placed in buffered formalin and sent for pathology [Tolerated Well] : Patient tolerated the procedure well [No Complications] : No complications [Abnormal Uterine Bleeding] : abnormal uterine bleeding [Transvaginal Ultrasound] : transvaginal ultrasound [Anteverted] : anteverted [L: ___ cm] : L: [unfilled] cm [H: ___ cm] : H: [unfilled] cm [LMPDate] : 06/03/2022 [FreeTextEntry5] : Endometrial thickness 6.0 mm [FreeTextEntry7] : 2.8 x 3.9 cm, right ovarian cyst simple appearing 2.1 x 3.1 cm [FreeTextEntry8] : 2.0 x 3.0 cm

## 2023-02-24 ENCOUNTER — NON-APPOINTMENT (OUTPATIENT)
Age: 53
End: 2023-02-24

## 2023-03-08 ENCOUNTER — NON-APPOINTMENT (OUTPATIENT)
Age: 53
End: 2023-03-08

## 2023-04-18 ENCOUNTER — RESULT REVIEW (OUTPATIENT)
Age: 53
End: 2023-04-18

## 2023-04-18 ENCOUNTER — OUTPATIENT (OUTPATIENT)
Dept: OUTPATIENT SERVICES | Facility: HOSPITAL | Age: 53
LOS: 1 days | End: 2023-04-18
Payer: MEDICARE

## 2023-04-18 DIAGNOSIS — R92.2 INCONCLUSIVE MAMMOGRAM: ICD-10-CM

## 2023-04-18 DIAGNOSIS — Z90.89 ACQUIRED ABSENCE OF OTHER ORGANS: Chronic | ICD-10-CM

## 2023-04-18 DIAGNOSIS — Z98.890 OTHER SPECIFIED POSTPROCEDURAL STATES: Chronic | ICD-10-CM

## 2023-04-18 DIAGNOSIS — Z12.31 ENCOUNTER FOR SCREENING MAMMOGRAM FOR MALIGNANT NEOPLASM OF BREAST: ICD-10-CM

## 2023-04-18 PROCEDURE — 76641 ULTRASOUND BREAST COMPLETE: CPT | Mod: 50

## 2023-04-18 PROCEDURE — 77063 BREAST TOMOSYNTHESIS BI: CPT | Mod: 26

## 2023-04-18 PROCEDURE — 77067 SCR MAMMO BI INCL CAD: CPT | Mod: 26

## 2023-04-18 PROCEDURE — 77063 BREAST TOMOSYNTHESIS BI: CPT

## 2023-04-18 PROCEDURE — 76641 ULTRASOUND BREAST COMPLETE: CPT | Mod: 26,50,GZ

## 2023-04-18 PROCEDURE — 77067 SCR MAMMO BI INCL CAD: CPT

## 2023-04-19 DIAGNOSIS — R92.2 INCONCLUSIVE MAMMOGRAM: ICD-10-CM

## 2023-04-19 DIAGNOSIS — Z12.31 ENCOUNTER FOR SCREENING MAMMOGRAM FOR MALIGNANT NEOPLASM OF BREAST: ICD-10-CM

## 2023-09-22 ENCOUNTER — NON-APPOINTMENT (OUTPATIENT)
Age: 53
End: 2023-09-22

## 2023-09-28 NOTE — BRIEF OPERATIVE NOTE - NSICDXBRIEFOPLAUNCH_GEN_ALL_CORE
<--- Click to Launch ICDx for PreOp, PostOp and Procedure Pre-Excision Curettage Text (Leave Blank If You Do Not Want): Prior to drawing the surgical margin the visible lesion was removed with electrodesiccation and curettage to clearly define the lesion size.

## 2023-10-11 ENCOUNTER — APPOINTMENT (OUTPATIENT)
Dept: OBGYN | Facility: CLINIC | Age: 53
End: 2023-10-11
Payer: MEDICARE

## 2023-10-11 VITALS
WEIGHT: 163 LBS | HEIGHT: 61 IN | BODY MASS INDEX: 30.78 KG/M2 | SYSTOLIC BLOOD PRESSURE: 118 MMHG | DIASTOLIC BLOOD PRESSURE: 78 MMHG

## 2023-10-11 DIAGNOSIS — Z01.419 ENCOUNTER FOR GYNECOLOGICAL EXAMINATION (GENERAL) (ROUTINE) W/OUT ABNORMAL FINDINGS: ICD-10-CM

## 2023-10-11 PROCEDURE — G0101: CPT

## 2023-10-19 ENCOUNTER — APPOINTMENT (OUTPATIENT)
Dept: BREAST CENTER | Facility: CLINIC | Age: 53
End: 2023-10-19
Payer: MEDICARE

## 2023-10-19 VITALS
WEIGHT: 163 LBS | HEART RATE: 79 BPM | BODY MASS INDEX: 30.78 KG/M2 | SYSTOLIC BLOOD PRESSURE: 112 MMHG | DIASTOLIC BLOOD PRESSURE: 72 MMHG | HEIGHT: 61 IN

## 2023-10-19 DIAGNOSIS — Z12.39 ENCOUNTER FOR OTHER SCREENING FOR MALIGNANT NEOPLASM OF BREAST: ICD-10-CM

## 2023-10-19 DIAGNOSIS — R92.30 DENSE BREASTS, UNSPECIFIED: ICD-10-CM

## 2023-10-19 PROCEDURE — 99212 OFFICE O/P EST SF 10 MIN: CPT

## 2023-12-08 ENCOUNTER — RESULT REVIEW (OUTPATIENT)
Age: 53
End: 2023-12-08

## 2023-12-08 ENCOUNTER — OUTPATIENT (OUTPATIENT)
Dept: OUTPATIENT SERVICES | Facility: HOSPITAL | Age: 53
LOS: 1 days | End: 2023-12-08
Payer: MEDICARE

## 2023-12-08 DIAGNOSIS — Z12.39 ENCOUNTER FOR OTHER SCREENING FOR MALIGNANT NEOPLASM OF BREAST: ICD-10-CM

## 2023-12-08 DIAGNOSIS — N60.11 DIFFUSE CYSTIC MASTOPATHY OF RIGHT BREAST: ICD-10-CM

## 2023-12-08 DIAGNOSIS — Z90.89 ACQUIRED ABSENCE OF OTHER ORGANS: Chronic | ICD-10-CM

## 2023-12-08 DIAGNOSIS — Z98.890 OTHER SPECIFIED POSTPROCEDURAL STATES: Chronic | ICD-10-CM

## 2023-12-08 PROCEDURE — C8937: CPT

## 2023-12-08 PROCEDURE — 77049 MRI BREAST C-+ W/CAD BI: CPT | Mod: 26

## 2023-12-08 PROCEDURE — 77049 MRI BREAST C-+ W/CAD BI: CPT

## 2023-12-08 PROCEDURE — A9579: CPT

## 2023-12-09 DIAGNOSIS — N60.11 DIFFUSE CYSTIC MASTOPATHY OF RIGHT BREAST: ICD-10-CM

## 2024-05-20 ENCOUNTER — RESULT REVIEW (OUTPATIENT)
Age: 54
End: 2024-05-20

## 2024-05-20 ENCOUNTER — OUTPATIENT (OUTPATIENT)
Dept: OUTPATIENT SERVICES | Facility: HOSPITAL | Age: 54
LOS: 1 days | End: 2024-05-20
Payer: MEDICARE

## 2024-05-20 DIAGNOSIS — Z12.31 ENCOUNTER FOR SCREENING MAMMOGRAM FOR MALIGNANT NEOPLASM OF BREAST: ICD-10-CM

## 2024-05-20 DIAGNOSIS — Z98.890 OTHER SPECIFIED POSTPROCEDURAL STATES: Chronic | ICD-10-CM

## 2024-05-20 DIAGNOSIS — R92.2 INCONCLUSIVE MAMMOGRAM: ICD-10-CM

## 2024-05-20 DIAGNOSIS — N60.11 DIFFUSE CYSTIC MASTOPATHY OF RIGHT BREAST: ICD-10-CM

## 2024-05-20 DIAGNOSIS — Z90.89 ACQUIRED ABSENCE OF OTHER ORGANS: Chronic | ICD-10-CM

## 2024-05-20 PROCEDURE — 77063 BREAST TOMOSYNTHESIS BI: CPT

## 2024-05-20 PROCEDURE — 77067 SCR MAMMO BI INCL CAD: CPT | Mod: 26

## 2024-05-20 PROCEDURE — 77063 BREAST TOMOSYNTHESIS BI: CPT | Mod: 26

## 2024-05-20 PROCEDURE — 76641 ULTRASOUND BREAST COMPLETE: CPT | Mod: 26,50

## 2024-05-20 PROCEDURE — 76641 ULTRASOUND BREAST COMPLETE: CPT | Mod: 50

## 2024-05-20 PROCEDURE — 77067 SCR MAMMO BI INCL CAD: CPT

## 2024-05-21 DIAGNOSIS — R92.2 INCONCLUSIVE MAMMOGRAM: ICD-10-CM

## 2024-05-21 DIAGNOSIS — Z12.31 ENCOUNTER FOR SCREENING MAMMOGRAM FOR MALIGNANT NEOPLASM OF BREAST: ICD-10-CM
